# Patient Record
Sex: MALE | Race: WHITE
[De-identification: names, ages, dates, MRNs, and addresses within clinical notes are randomized per-mention and may not be internally consistent; named-entity substitution may affect disease eponyms.]

---

## 2018-01-23 ENCOUNTER — HOSPITAL ENCOUNTER (EMERGENCY)
Dept: HOSPITAL 63 - ER | Age: 48
Discharge: HOME | End: 2018-01-23
Payer: OTHER GOVERNMENT

## 2018-01-23 VITALS — DIASTOLIC BLOOD PRESSURE: 76 MMHG | SYSTOLIC BLOOD PRESSURE: 143 MMHG

## 2018-01-23 VITALS — HEIGHT: 68 IN | BODY MASS INDEX: 26.07 KG/M2 | WEIGHT: 172 LBS

## 2018-01-23 DIAGNOSIS — E80.7: ICD-10-CM

## 2018-01-23 DIAGNOSIS — R79.89: ICD-10-CM

## 2018-01-23 DIAGNOSIS — J32.0: ICD-10-CM

## 2018-01-23 DIAGNOSIS — E87.6: ICD-10-CM

## 2018-01-23 DIAGNOSIS — H57.11: Primary | ICD-10-CM

## 2018-01-23 DIAGNOSIS — R79.1: ICD-10-CM

## 2018-01-23 LAB
ALBUMIN SERPL-MCNC: 4.4 G/DL (ref 3.4–5)
ALP SERPL-CCNC: 93 U/L (ref 46–116)
ALT SERPL-CCNC: 122 U/L (ref 16–63)
AMPHETAMINE/METHAMPHETAMINE: (no result)
ANION GAP SERPL CALC-SCNC: 13 MMOL/L (ref 6–14)
APTT PPP: YELLOW S
AST SERPL-CCNC: 39 U/L (ref 15–37)
BACTERIA #/AREA URNS HPF: 0 /HPF
BARBITURATES UR-MCNC: (no result) UG/ML
BASOPHILS # BLD AUTO: 0 X10^3/UL (ref 0–0.2)
BASOPHILS NFR BLD: 1 % (ref 0–3)
BENZODIAZ UR-MCNC: (no result) UG/L
BILIRUB DIRECT SERPL-MCNC: 0.3 MG/DL (ref 0–0.2)
BILIRUB SERPL-MCNC: 1.2 MG/DL (ref 0.2–1)
BILIRUB UR QL STRIP: (no result)
CA-I SERPL ISE-MCNC: 17 MG/DL (ref 8–26)
CALCIUM SERPL-MCNC: 9.1 MG/DL (ref 8.5–10.1)
CANNABINOIDS UR-MCNC: (no result) UG/L
CHLORIDE SERPL-SCNC: 102 MMOL/L (ref 98–107)
CK SERPL-CCNC: 195 U/L (ref 39–308)
CO2 SERPL-SCNC: 26 MMOL/L (ref 21–32)
COCAINE UR-MCNC: (no result) NG/ML
CREAT SERPL-MCNC: 1.1 MG/DL (ref 0.7–1.3)
CRP SERPL-MCNC: 2.1 MG/L (ref 0–3.3)
EOSINOPHIL NFR BLD: 0.1 X10^3/UL (ref 0–0.7)
EOSINOPHIL NFR BLD: 1 % (ref 0–3)
ERYTHROCYTE [DISTWIDTH] IN BLOOD BY AUTOMATED COUNT: 13.1 % (ref 11.5–14.5)
ERYTHROCYTE [SEDIMENTATION RATE] IN BLOOD: 2 MM/H (ref 0–15)
FIBRINOGEN PPP-MCNC: CLEAR MG/DL
GFR SERPLBLD BASED ON 1.73 SQ M-ARVRAT: 72.4 ML/MIN
GLUCOSE SERPL-MCNC: 118 MG/DL (ref 70–99)
GLUCOSE UR STRIP-MCNC: (no result) MG/DL
HCT VFR BLD CALC: 44.9 % (ref 39–53)
HGB BLD-MCNC: 15.6 G/DL (ref 13–17.5)
LIPASE: 180 U/L (ref 73–393)
LYMPHOCYTES # BLD: 1.4 X10^3/UL (ref 1–4.8)
LYMPHOCYTES NFR BLD AUTO: 30 % (ref 24–48)
MAGNESIUM SERPL-MCNC: 2 MG/DL (ref 1.8–2.4)
MCH RBC QN AUTO: 31 PG (ref 25–35)
MCHC RBC AUTO-ENTMCNC: 35 G/DL (ref 31–37)
MCV RBC AUTO: 88 FL (ref 79–100)
METHADONE SERPL-MCNC: (no result) NG/ML
MONO #: 0.4 X10^3/UL (ref 0–1.1)
MONOCYTES NFR BLD: 8 % (ref 0–9)
NEUT #: 2.7 X10^3UL (ref 1.8–7.7)
NEUTROPHILS NFR BLD AUTO: 60 % (ref 31–73)
NITRITE UR QL STRIP: (no result)
OPIATES UR-MCNC: (no result) NG/ML
PCP SERPL-MCNC: (no result) MG/DL
PLATELET # BLD AUTO: 159 X10^3/UL (ref 140–400)
POTASSIUM SERPL-SCNC: 3.2 MMOL/L (ref 3.5–5.1)
PROT SERPL-MCNC: 7.8 G/DL (ref 6.4–8.2)
RBC # BLD AUTO: 5.1 X10^6/UL (ref 4.3–5.7)
RBC #/AREA URNS HPF: (no result) /HPF (ref 0–2)
SODIUM SERPL-SCNC: 141 MMOL/L (ref 136–145)
SP GR UR STRIP: 1.01
SQUAMOUS #/AREA URNS LPF: (no result) /LPF
UROBILINOGEN UR-MCNC: 1 MG/DL
WBC # BLD AUTO: 4.6 X10^3/UL (ref 4–11)
WBC #/AREA URNS HPF: (no result) /HPF (ref 0–4)

## 2018-01-23 PROCEDURE — 96375 TX/PRO/DX INJ NEW DRUG ADDON: CPT

## 2018-01-23 PROCEDURE — 80048 BASIC METABOLIC PNL TOTAL CA: CPT

## 2018-01-23 PROCEDURE — 83880 ASSAY OF NATRIURETIC PEPTIDE: CPT

## 2018-01-23 PROCEDURE — 84484 ASSAY OF TROPONIN QUANT: CPT

## 2018-01-23 PROCEDURE — 82553 CREATINE MB FRACTION: CPT

## 2018-01-23 PROCEDURE — 96361 HYDRATE IV INFUSION ADD-ON: CPT

## 2018-01-23 PROCEDURE — 93005 ELECTROCARDIOGRAM TRACING: CPT

## 2018-01-23 PROCEDURE — 81001 URINALYSIS AUTO W/SCOPE: CPT

## 2018-01-23 PROCEDURE — 70450 CT HEAD/BRAIN W/O DYE: CPT

## 2018-01-23 PROCEDURE — 83690 ASSAY OF LIPASE: CPT

## 2018-01-23 PROCEDURE — 85379 FIBRIN DEGRADATION QUANT: CPT

## 2018-01-23 PROCEDURE — 96372 THER/PROPH/DIAG INJ SC/IM: CPT

## 2018-01-23 PROCEDURE — G0479 DRUG TEST PRESUMP NOT OPT: HCPCS

## 2018-01-23 PROCEDURE — 80307 DRUG TEST PRSMV CHEM ANLYZR: CPT

## 2018-01-23 PROCEDURE — 85730 THROMBOPLASTIN TIME PARTIAL: CPT

## 2018-01-23 PROCEDURE — 84443 ASSAY THYROID STIM HORMONE: CPT

## 2018-01-23 PROCEDURE — 85651 RBC SED RATE NONAUTOMATED: CPT

## 2018-01-23 PROCEDURE — 36415 COLL VENOUS BLD VENIPUNCTURE: CPT

## 2018-01-23 PROCEDURE — 86140 C-REACTIVE PROTEIN: CPT

## 2018-01-23 PROCEDURE — 70481 CT ORBIT/EAR/FOSSA W/DYE: CPT

## 2018-01-23 PROCEDURE — 83735 ASSAY OF MAGNESIUM: CPT

## 2018-01-23 PROCEDURE — 86038 ANTINUCLEAR ANTIBODIES: CPT

## 2018-01-23 PROCEDURE — 80076 HEPATIC FUNCTION PANEL: CPT

## 2018-01-23 PROCEDURE — 80074 ACUTE HEPATITIS PANEL: CPT

## 2018-01-23 PROCEDURE — 96374 THER/PROPH/DIAG INJ IV PUSH: CPT

## 2018-01-23 PROCEDURE — 85025 COMPLETE CBC W/AUTO DIFF WBC: CPT

## 2018-01-23 PROCEDURE — 85610 PROTHROMBIN TIME: CPT

## 2018-01-23 PROCEDURE — 99285 EMERGENCY DEPT VISIT HI MDM: CPT

## 2018-01-23 NOTE — EKG
[facility] [address], [place] [address]

Test Date:    2018               Test Time:    18:32:14

Pat Name:     JAZMIN VARGAS        Department:   

Patient ID:   [id number]           Room:          

Gender:       M                        Technician:   PEDRO

:          1970               Requested By: EL BERGERON

Order Number: [id number]            Reading MD:   Marvin Sawyer MD

                                 Measurements

Intervals                              Axis          

Rate:         59                       P:            38

WA:           172                      QRS:          14

QRSD:         106                      T:            14

QT:           432                                    

QTc:          432                                    

                           Interpretive Statements

SINUS RHYTHM



Electronically Signed On 2018 12:33:49 CST by Marvin Sawyer MD

## 2018-01-23 NOTE — RAD
Exam performed: CT orbits with contrast. 

 

Indication: Headache and pain and pressure behind the right eye, light 

sensitivity, history of neuritis, nausea and vomiting. 

 

Date of service: 1/23/2018,Comparison: None available

 

Technique: Contiguous acquisitions are obtained through the orbits during 

intravenous administration of 75 cc of Omnipaque 300. Sagittal and coronal

reformatted images are obtained and reviewed. 

 

Findings:

 

The bony orbital margins and the intraocular contents are bilaterally 

symmetric and unremarkable. No abnormal enhancing lesions are seen.  

Normal aeration of both frontal, ethmoid, sphenoid and maxillary sinuses 

is seen. Mild mucoperiosteal thickening involving the left maxillary sinus

is noted. Both ostiomeatal complexes are preserved.  Nasal bones and 

zygomatic arches are preserved.No abnormal fluid collections or hematoma 

formation seen.

 

The visualized portion of the brain is normal. 

 

Impression:

    1. No acute abnormality seen in the orbits to account for patient's 

symptoms of pain and pressure behind right eye.  

2. Chronic left maxillary sinus disease.

 

 

PQRS Compliance Statement:

 

One or more of the following individualized dose reduction techniques were

utilized for this examination:  

1. Automated exposure control  

2. Adjustment of the mA and/or kV according to patient size  

3. Use of iterative reconstruction technique

 

 

Electronically signed by: Dipika Foster MD (1/23/2018 9:30 PM) [facility]

## 2018-01-23 NOTE — ED.ADGEN
Adult General


Chief Complaint


Chief Complaint


" I don't know..'I was just working on my computer .. and got this pain behind 

my Rt eye.. I ve had optic nerve neuritis before.. but this feels different...'





HPI


HPI





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 

onset of Rt eye pain while working on his computer.  Pt. denies trauma.  Recent 

travel or specific ill contacts.  Up to date with vaccinations.  Pt. has had 

previous episode of Rt. optic nerve neuritis when station in [place].  Pt. at 

that time treated with high dose steroids.  Pt. was hospitalized because of 

MERS exposure at same time.  No visual acuity changes.  No limbus injection.  

No field loss.  No consensual photophobia.  No visual changes.  Limited fundus 

exam.  Pt. reports no collateral inflammatory disorder found for his right 

optic neuritis on his workup in [place].  Treatment that time consisted of 

high-dose steroids.





Review of Systems


Review of Systems





Constitutional: Denies fever or chills []


Eyes: Denies change in visual acuity, redness, . Complaints of Rt.  eye pain []


HENT: Denies nasal congestion or sore throat []


Respiratory: Denies cough or shortness of breath []


Cardiovascular: No additional information not addressed in HPI []


GI: Denies abdominal pain, nausea, vomiting, bloody stools or diarrhea []


: Denies dysuria or hematuria []


Musculoskeletal: Denies back pain or joint pain []


Integument: Denies rash or skin lesions []


Neurologic: Denies headache, focal weakness or sensory changes []


Endocrine: Denies polyuria or polydipsia []





All other systems were reviewed and found to be within normal limits, except as 

documented in this note.





Family History


Family History


Non-contributory





Current Medications


Current Medications





Current Medications








 Medications


  (Trade)  Dose


 Ordered  Sig/Marlys  Start Time


 Stop Time Status Last Admin


Dose Admin


 


 Aspirin


  (Veto Aspirin)  325 mg  1X  ONCE  1/23/18 22:15


 1/23/18 22:15 DC 1/23/18 22:05


325 MG


 


 Ceftriaxone


 Sodium 1 gm/


 Sodium Chloride  50 ml @ 


 100 mls/hr  1X  ONCE  1/23/18 21:45


 1/23/18 22:14 UNV  


 


 


 Ceftriaxone Sodium


  (Rocephin)  1 gm  1X  ONCE  1/23/18 22:00


 1/23/18 22:01 DC 1/23/18 21:56


1 GM


 


 Enoxaparin Sodium


  (Lovenox 80mg


 Syringe)  80 mg  1X  ONCE  1/23/18 22:15


 1/23/18 22:15 DC 1/23/18 22:12


80 MG


 


 Info


  (Do NOT chart on


 this entry -- for


 MONITORING)  1 each  PRN DAILY  PRN  1/23/18 19:15


 1/23/18 22:15 DC  


 


 


 Iohexol


  (Omnipaque 300


 Mg/ml)  75 ml  1X  ONCE  1/23/18 19:30


 1/23/18 19:31 DC 1/23/18 19:49


75 ML


 


 Ketorolac


 Tromethamine


  (Toradol)  30 mg  1X  ONCE  1/23/18 21:00


 1/23/18 21:01 DC 1/23/18 20:38


30 MG


 


 Methylprednisolone


 Sodium Succinate


  (SOLU-Medrol


 125MG VIAL)  125 mg  1X  ONCE  1/23/18 22:00


 1/23/18 22:01 DC 1/23/18 21:56


125 MG


 


 Ondansetron HCl


  (Zofran)  8 mg  1X  ONCE  1/23/18 18:30


 1/23/18 18:36 DC 1/23/18 18:16


8 MG


 


 Potassium Chloride


  (KCl Oral Soln)  40 meq  1X  ONCE  1/23/18 20:15


 1/23/18 20:16 DC 1/23/18 20:36


40 MEQ


 


 Sodium Chloride  1,000 ml @ 


 1,000 mls/hr  Q1H  1/23/18 18:22


 1/23/18 19:21 DC 1/23/18 18:22


1,000 MLS/HR





See Nursing for home meds





Allergies


Allergies





Allergies








Coded Allergies Type Severity Reaction Last Updated Verified


 


  No Known Drug Allergies    1/23/18 No











Physical Exam


Physical Exam





Constitutional: Well developed, well nourished, mild distress, non-toxic 

appearance. []


HENT: Normocephalic, atraumatic, bilateral external ears normal, oropharynx 

moist, no oral exudates, nose normal. []No temperol art. tenderness. 


Eyes: PERRLA, EOMI, conjunctiva normal, no discharge.  Pain behind Rt. eye- 

exam as per HPI


Neck: Normal range of motion, no tenderness, supple, no stridor. [] 


Cardiovascular:Heart rate regular rhythm, no murmur []


Lungs & Thorax:  Bilateral breath sounds clear to auscultation []


Abdomen: Bowel sounds normal, soft, no tenderness, no masses, no pulsatile 

masses. [] 


Skin: Warm, dry, no erythema, no rash. [] 


Back: No tenderness, no CVA tenderness. [] 


Extremities: No tenderness, no cyanosis, no clubbing, ROM intact, no edema. [] 

No cording appreciated.


Neurologic: Alert and oriented X 3, normal motor function, normal sensory 

function, no focal deficits noted. []


Psychologic: Affect normal, judgement normal, mood normal. []





Current Patient Data


Vital Signs





 Vital Signs








  Date Time  Temp Pulse Resp B/P (MAP) Pulse Ox O2 Delivery O2 Flow Rate FiO2


 


1/23/18 22:05  58 18 143/76 (98) 95 Room Air  


 


1/23/18 18:00 98.0       








Lab Results





 Laboratory Tests








Test


  1/23/18


18:07 1/23/18


19:56


 


White Blood Count


  4.6 x10^3/uL


(4.0-11.0) 


 


 


Red Blood Count


  5.10 x10^6/uL


(4.30-5.70) 


 


 


Hemoglobin


  15.6 g/dL


(13.0-17.5) 


 


 


Hematocrit


  44.9 %


(39.0-53.0) 


 


 


Mean Corpuscular Volume


  88 fL ()


  


 


 


Mean Corpuscular Hemoglobin 31 pg (25-35)   


 


Mean Corpuscular Hemoglobin


Concent 35 g/dL


(31-37) 


 


 


Red Cell Distribution Width


  13.1 %


(11.5-14.5) 


 


 


Platelet Count


  159 x10^3/uL


(140-400) 


 


 


Neutrophils (%) (Auto) 60 % (31-73)   


 


Lymphocytes (%) (Auto) 30 % (24-48)   


 


Monocytes (%) (Auto) 8 % (0-9)   


 


Eosinophils (%) (Auto) 1 % (0-3)   


 


Basophils (%) (Auto) 1 % (0-3)   


 


Neutrophils # (Auto)


  2.7 x10^3uL


(1.8-7.7) 


 


 


Lymphocytes # (Auto)


  1.4 x10^3/uL


(1.0-4.8) 


 


 


Monocytes # (Auto)


  0.4 x10^3/uL


(0.0-1.1) 


 


 


Eosinophils # (Auto)


  0.1 x10^3/uL


(0.0-0.7) 


 


 


Basophils # (Auto)


  0.0 x10^3/uL


(0.0-0.2) 


 


 


Erythrocyte Sedimentation Rate 2 (0-15)   


 


Prothrombin Time


  10.9 SEC


(9.4-11.4) 


 


 


Prothrombin Time INR 1.1 (0.9-1.1)   


 


PTT


  25 SEC (23-33)


  


 


 


D-Dimer (Jessica)


  1.21 mg/L


(0.00-0.50)  H 


 


 


Sodium Level


  141 mmol/L


(136-145) 


 


 


Potassium Level


  3.2 mmol/L


(3.5-5.1)  L 


 


 


Chloride Level


  102 mmol/L


() 


 


 


Carbon Dioxide Level


  26 mmol/L


(21-32) 


 


 


Anion Gap 13 (6-14)   


 


Blood Urea Nitrogen


  17 mg/dL


(8-26) 


 


 


Creatinine


  1.1 mg/dL


(0.7-1.3) 


 


 


Estimated GFR


(Cockcroft-Gault) 72.4  


  


 


 


Glucose Level


  118 mg/dL


(70-99)  H 


 


 


Calcium Level


  9.1 mg/dL


(8.5-10.1) 


 


 


Magnesium Level


  2.0 mg/dL


(1.8-2.4) 


 


 


Total Bilirubin


  1.2 mg/dL


(0.2-1.0)  H 


 


 


Direct Bilirubin


  0.3 mg/dL


(0.0-0.2)  H 


 


 


Aspartate Amino Transferase


(AST) 39 U/L (15-37)


H 


 


 


Alanine Aminotransferase (ALT)


  122 U/L


(16-63)  H 


 


 


Alkaline Phosphatase


  93 U/L


() 


 


 


Creatine Kinase


  195 U/L


() 


 


 


Creatine Kinase MB (Mass)


  0.7 ng/mL


(0.0-3.6) 


 


 


Creatine Kinase MB Relative


Index 0.4 % (0-4)  


  


 


 


Troponin I Quantitative


  < 0.017 ng/mL


(0-0.055) 


 


 


C-Reactive Protein


  2.1 mg/L


(0-3.3) 


 


 


NT-Pro-B-Type Natriuretic


Peptide 7 pg/mL


(0-124) 


 


 


Total Protein


  7.8 g/dL


(6.4-8.2) 


 


 


Albumin


  4.4 g/dL


(3.4-5.0) 


 


 


Lipase


  180 U/L


() 


 


 


Urine Collection Type  Unknown  


 


Urine Color  Yellow  


 


Urine Clarity  Clear  


 


Urine pH  7.5  


 


Urine Specific Gravity  1.015  


 


Urine Protein


  


  Neg


(NEG-TRACE)


 


Urine Glucose (UA)


  


  Neg mg/dL


(NEG)


 


Urine Ketones (Stick)


  


  80 mg/dL (NEG)


 


 


Urine Blood  Neg (NEG)  


 


Urine Nitrite  Neg (NEG)  


 


Urine Bilirubin  Neg (NEG)  


 


Urine Urobilinogen Dipstick


  


  1 mg/dL (0.2


mg/dL)


 


Urine Leukocyte Esterase  Neg (NEG)  


 


Urine RBC


  


  Rare /HPF


(0-2)


 


Urine WBC


  


  Occ /HPF (0-4)


 


 


Urine Squamous Epithelial


Cells 


  Few /LPF  


 


 


Urine Bacteria


  


  0 /HPF (0-FEW)


 


 


Urine Mucus  Slight /LPF  


 


Urine Opiates Screen  Neg (NEG)  


 


Urine Methadone Screen  Neg (NEG)  


 


Urine Barbiturates  Neg (NEG)  


 


Urine Phencyclidine Screen  Neg (NEG)  


 


Urine


Amphetamine/Methamphetamine 


  Neg (NEG)  


 


 


Urine Benzodiazepines Screen  Neg (NEG)  


 


Urine Cocaine Screen  Neg (NEG)  


 


Urine Cannabinoids Screen  Neg (NEG)  


 


Urine Ethyl Alcohol  Neg (NEG)  











EKG


EKG


[]





Radiology/Procedures


Radiology/Procedures


CT head and orbit shows no acute mass, edema, shift, bleed, or fracture. No 

findings of post orbit mass. Does have findings of chronic sinusitis.  See 

formal report when available.[]





Course & Med Decision Making


Course & Med Decision Making


Pertinent Labs and Imaging studies reviewed. (See chart for details).  Follow 

up with Opth. in AM.  Take Amoxicillin 500 tid.   Return if any concerns.  See 

[place] in AM.  Must get ophthalmology exam in a.m. Take a daily baby aspirin. 

Take Zofran as needed for nausea and vomiting. Follow-up pending labs such as 

hepatitis panel. If unable to get in to ophthalmology at [place] tomorrow 

morning must be seen by ophthalmologist-referral given to Dr. Aragon- 

for follow up.    Likely garcia of acute optic neuritis is low, with normal sed.  

rate and CRP.  But still need formal fundus dilated exam. 








[]





Final Impression


Final Impression


1. Rt. eye pain[]


2. Hypokalemia


3. Elevated D-dimer


4. Elevated Brendan, LFTs


5.  Maxillary Sinusitis


Problems:  





Dragon Disclaimer


Dragon Disclaimer


This electronic medical record was generated, in whole or in part, using a 

voice recognition dictation system.











EL BERGERON MD Jan 23, 2018 18:21

## 2018-01-23 NOTE — RAD
Exam performed: CT scan of the head without contrast. 

 

Date of Service: 1/23/2018. Comparison: None available.

 

Clinical History: Headache and pain and pressure behind right eye, 

sensitivity to light, nausea and vomiting.

 

Technique: Helical acquisitions are obtained from the foramen magnum to 

the vertex without intravenous administration of contrast. 

 

Findings:

 

The ventricles are midline without evidence of dilatation. Normal 

gray-white differentiation is maintained.  There is no extra axial fluid 

collection, intraparenchymal hemorrhage or mass lesion.  The visualized 

portions of the orbits, paranasal sinuses and the mastoid air cells appear

clear.  The calvarium is intact.  

 

Impression:

1. No acute intracranial process detected. 

 

 

PQRS Compliance Statement:

 

One or more of the following individualized dose reduction techniques were

utilized for this examination:

1. Automated exposure control

2. Adjustment of the mA and/or kV according to patient size

3. Use of iterative reconstruction technique

 

 

Electronically signed by: Dipika Foster MD (1/23/2018 8:18 PM) [facility]

## 2018-01-25 LAB — HCV ANTIBODY: <0.1 S/CO RATIO (ref 0–0.9)
